# Patient Record
Sex: FEMALE | URBAN - METROPOLITAN AREA
[De-identification: names, ages, dates, MRNs, and addresses within clinical notes are randomized per-mention and may not be internally consistent; named-entity substitution may affect disease eponyms.]

---
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
## 2025-05-14 ENCOUNTER — APPOINTMENT (OUTPATIENT)
Dept: URBAN - METROPOLITAN AREA CLINIC 78 | Facility: CLINIC | Age: 20
Setting detail: DERMATOLOGY
End: 2025-05-14

## 2025-05-14 DIAGNOSIS — L40.0 PSORIASIS VULGARIS: ICD-10-CM

## 2025-05-14 PROCEDURE — ? PRESCRIPTION MEDICATION MANAGEMENT

## 2025-05-14 PROCEDURE — 99203 OFFICE O/P NEW LOW 30 MIN: CPT

## 2025-05-14 PROCEDURE — ? PRESCRIPTION

## 2025-05-14 PROCEDURE — ? COUNSELING

## 2025-05-14 RX ORDER — CLOBETASOL PROPIONATE 0.5 MG/ML
SOLUTION TOPICAL
Qty: 50 | Refills: 0 | Status: ERX | COMMUNITY
Start: 2025-05-14

## 2025-05-14 RX ADMIN — CLOBETASOL PROPIONATE: 0.5 SOLUTION TOPICAL at 00:00

## 2025-05-14 ASSESSMENT — PGA PSORIASIS: PGA PSORIASIS 2020: MILD

## 2025-05-14 ASSESSMENT — ITCH NUMERIC RATING SCALE: HOW SEVERE IS YOUR ITCHING?: 5

## 2025-05-14 ASSESSMENT — BSA PSORIASIS: % BODY COVERED IN PSORIASIS: 1

## 2025-05-14 NOTE — PROCEDURE: PRESCRIPTION MEDICATION MANAGEMENT
Initiate Treatment: clobetasol 0.05 % scalp solution \\nQuantity: 50.0 ml\\nSig: Apply to scalp once a day
Render In Strict Bullet Format?: No
Detail Level: Zone